# Patient Record
Sex: MALE
[De-identification: names, ages, dates, MRNs, and addresses within clinical notes are randomized per-mention and may not be internally consistent; named-entity substitution may affect disease eponyms.]

---

## 2023-04-21 ENCOUNTER — APPOINTMENT (OUTPATIENT)
Dept: ORTHOPEDIC SURGERY | Facility: CLINIC | Age: 28
End: 2023-04-21
Payer: OTHER MISCELLANEOUS

## 2023-04-21 ENCOUNTER — NON-APPOINTMENT (OUTPATIENT)
Age: 28
End: 2023-04-21

## 2023-04-21 PROBLEM — Z00.00 ENCOUNTER FOR PREVENTIVE HEALTH EXAMINATION: Status: ACTIVE | Noted: 2023-04-21

## 2023-04-21 PROCEDURE — 73630 X-RAY EXAM OF FOOT: CPT | Mod: RT

## 2023-04-21 PROCEDURE — 99203 OFFICE O/P NEW LOW 30 MIN: CPT | Mod: ACP

## 2023-04-21 PROCEDURE — 73610 X-RAY EXAM OF ANKLE: CPT | Mod: RT

## 2023-04-21 NOTE — IMAGING
[de-identified] : Weightbearing x-rays taken of the patient's right foot and ankle in office today revealed no obvious fractures, subluxations, or dislocations.  No significant abnormalities were seen.

## 2023-04-21 NOTE — PHYSICAL EXAM
[Right] : right foot and ankle [5th] : 5th [5___] : [facility] 5[unfilled]/5 [2+] : posterior tibialis pulse: 2+ [] : mildly antalgic [[email or web address]] : Mild limited ROM of foot/ankle secondary to swelling/pain
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
## 2023-04-21 NOTE — DISCUSSION/SUMMARY
[de-identified] : Patient will take OTC NSAIDs as needed for pain.  The patient was advised to rest/ice the area and may alternate with warm compresses as needed.  Instructed not to perform any strenuous activity that may worsen symptoms.\par \par Gentle ROM exercises and Epson salt and warm water was encouraged.  Explained to the patient that this may take 4 to 6 weeks to fully heal and that the first 2 weeks are usually the worst.\par \par He was placed in a tall cam walker boot and may weight-bear as tolerated.  The foot/ankle conditioning program from the [facility] was given to the patient so they may try that at home.\par \par He has a 100% temporary total disability we will out of work until his next evaluation.  The patient will follow-up in 2 weeks for further evaluation.  All of the patient's questions/concerns were answered in detail.\par \par The patient was seen under the supervision of Dr. Pedraza.

## 2023-05-05 ENCOUNTER — NON-APPOINTMENT (OUTPATIENT)
Age: 28
End: 2023-05-05

## 2023-05-05 ENCOUNTER — APPOINTMENT (OUTPATIENT)
Dept: ORTHOPEDIC SURGERY | Facility: CLINIC | Age: 28
End: 2023-05-05
Payer: OTHER MISCELLANEOUS

## 2023-05-05 VITALS — HEIGHT: 69 IN | BODY MASS INDEX: 27.4 KG/M2 | WEIGHT: 185 LBS

## 2023-05-05 PROCEDURE — 99213 OFFICE O/P EST LOW 20 MIN: CPT

## 2023-05-05 NOTE — IMAGING
[de-identified] : He is tender palpation of the lateral aspect of his ankle.  There is pain with passive and active inversion of the ankle.  He has full range of motion.  Compartment soft compressible.  Neurovascular intact distally.

## 2023-05-05 NOTE — DISCUSSION/SUMMARY
[de-identified] : Likely stressI discussed the patient's findings with him.  At this time he remains 100% temporarily disabled.  If she is out of the boot or brace.  He will start physical therapy.  I will reassess him in 1 month to see how he does.  All questions answered 0

## 2023-05-05 NOTE — HISTORY OF PRESENT ILLNESS
[de-identified] : 28-year-old patient who sustained a right ankle injury at work.  He is seeing me for the first time.  He has been out of work.  He is describing persistent pain over the lateral aspect of his right ankle.  Denies any pain elsewhere.  He has not yet done physical therapy.  He is still in the boot.

## 2023-06-12 ENCOUNTER — APPOINTMENT (OUTPATIENT)
Dept: ORTHOPEDIC SURGERY | Facility: CLINIC | Age: 28
End: 2023-06-12
Payer: OTHER MISCELLANEOUS

## 2023-06-12 VITALS — HEIGHT: 69 IN | BODY MASS INDEX: 27.4 KG/M2 | WEIGHT: 185 LBS

## 2023-06-12 DIAGNOSIS — S93.401A SPRAIN OF UNSPECIFIED LIGAMENT OF RIGHT ANKLE, INITIAL ENCOUNTER: ICD-10-CM

## 2023-06-12 PROCEDURE — 99213 OFFICE O/P EST LOW 20 MIN: CPT

## 2023-06-12 NOTE — PHYSICAL EXAM
[de-identified] : Alert oriented x3.  Is pleasant cooperative exam.  Examination of his right lower extremity was undertaken.  Patient still has swelling and tenderness over the ATFL.  The ankle is slightly unstable but overall no evidence of significant instability.  Compartments are soft compressible.  He is neurovascular intact distally.

## 2023-06-12 NOTE — HISTORY OF PRESENT ILLNESS
[de-identified] : 28-year-old patient here for follow-up of his right ankle sprain.  He is doing physical therapy.  He is getting better.  However he still notes significant pain and instability involving the right ankle especially when going up and down stairs.  Otherwise denies any interval trauma.

## 2023-06-12 NOTE — DISCUSSION/SUMMARY
[de-identified] : I discussed the patient's findings with him I think this point the patient would benefit from continued physical therapy.  He remains 100% temporarily disabled.  I will see him back in 1 month for his hopefully last exam with me.

## 2023-07-17 ENCOUNTER — APPOINTMENT (OUTPATIENT)
Dept: ORTHOPEDIC SURGERY | Facility: CLINIC | Age: 28
End: 2023-07-17